# Patient Record
Sex: MALE | Race: BLACK OR AFRICAN AMERICAN | Employment: UNEMPLOYED | URBAN - METROPOLITAN AREA
[De-identification: names, ages, dates, MRNs, and addresses within clinical notes are randomized per-mention and may not be internally consistent; named-entity substitution may affect disease eponyms.]

---

## 2019-08-20 ENCOUNTER — OFFICE VISIT (OUTPATIENT)
Dept: NEUROLOGY | Age: 59
End: 2019-08-20

## 2019-08-20 ENCOUNTER — TELEPHONE (OUTPATIENT)
Dept: NEUROLOGY | Age: 59
End: 2019-08-20

## 2019-08-20 VITALS
HEIGHT: 66 IN | DIASTOLIC BLOOD PRESSURE: 60 MMHG | BODY MASS INDEX: 31.02 KG/M2 | WEIGHT: 193 LBS | OXYGEN SATURATION: 97 % | HEART RATE: 63 BPM | SYSTOLIC BLOOD PRESSURE: 130 MMHG | TEMPERATURE: 98.4 F | RESPIRATION RATE: 16 BRPM

## 2019-08-20 DIAGNOSIS — G43.019 INTRACTABLE MIGRAINE WITHOUT AURA AND WITHOUT STATUS MIGRAINOSUS: ICD-10-CM

## 2019-08-20 DIAGNOSIS — S06.9X9A TRAUMATIC BRAIN INJURY WITH LOSS OF CONSCIOUSNESS, INITIAL ENCOUNTER (HCC): Primary | ICD-10-CM

## 2019-08-20 DIAGNOSIS — R41.89 COGNITIVE IMPAIRMENT: ICD-10-CM

## 2019-08-20 DIAGNOSIS — G40.909 SEIZURE DISORDER (HCC): ICD-10-CM

## 2019-08-20 RX ORDER — DIVALPROEX SODIUM 500 MG/1
500 TABLET, EXTENDED RELEASE ORAL DAILY
Qty: 30 TAB | Refills: 3 | Status: SHIPPED | OUTPATIENT
Start: 2019-08-20

## 2019-08-20 RX ORDER — CARVEDILOL 25 MG/1
TABLET ORAL
Refills: 0 | COMMUNITY
Start: 2019-07-25

## 2019-08-20 RX ORDER — CLONIDINE HYDROCHLORIDE 0.2 MG/1
TABLET ORAL
COMMUNITY

## 2019-08-20 RX ORDER — GABAPENTIN 800 MG/1
TABLET ORAL
COMMUNITY

## 2019-08-20 RX ORDER — AMLODIPINE BESYLATE AND BENAZEPRIL HYDROCHLORIDE 10; 40 MG/1; MG/1
CAPSULE ORAL
COMMUNITY

## 2019-08-20 NOTE — PROGRESS NOTES
Roselyn Wayne presents today for   Chief Complaint   Patient presents with   [address]    Seizure       Is someone accompanying this pt? Yes, wife    Is the patient using any DME equipment during [address]? Yes, rollator    Depression Screening:  3 most recent PHQ Screens 8/20/2019   Little interest or pleasure in doing things Not at all   Feeling down, depressed, irritable, or hopeless Not at all   Total Score PHQ 2 0       Learning Assessment:  No flowsheet data found. Abuse Screening:  No flowsheet data found. Coordination of Care:  1. Have you been to the ER, urgent care clinic since your last visit? Hospitalized since your last visit? No    2. Have you seen or consulted any other health care providers outside of the [address] [place] [address] since your last visit? Include any pap smears or colon screening.  No

## 2019-08-20 NOTE — PATIENT INSTRUCTIONS
1-STOP LEVETIRACETAM  2-START DEPAKOTE ER 500MG, ONE TABLET IN THE MORNING  3-KEEP A HEADACHE JOURNAL

## 2019-08-20 NOTE — PROGRESS NOTES
HPI:  62 y/o male referred by Dr. Rosalba Tarango for evaluation of a seizure disorder. He had seen Dr. Olivia Malik back in April 2012 on one occasion. At that time he came with a history of a head injury that occurred in January 2012. There were doubts expressed on his note about what exactly happened, some notes say that he had a stroke and other history suggesting that he may have fallen out of a truck and struck his head, at any rate this was not exactly sorted out. He was admitted to the hospital, eventually went to a nursing home to recover, he had no recollection of what had happened. At the time Dr. Olivia Malik expressed that allegedly there was a history of an intra-cranial hemorrhage, but corroborating information was not available at the time of that visit. At the time problems with memory, language as well as pain throughout his entire body were reported. There is also comments that \"he may have had some seizures, having been placed on Keppra\". Because of lack of records, Dr. Rosalinda Marte at the time was to obtain his old records and see him back, but he never returned. The records available from 2012 were received and reviewed by me, with notes mentioning the fact that he fell out of a truck at work, that he had an intracranial hemorrhage, and that he was admitted to [facility] and eventually to [facility] for rehab. They add that he had some problems with writing and that he went through some physical therapy for this. I have been able to review the records from his admission to [facility]. They corroborated that the patient fell off a trash truck on the day of admission, he had vomiting, that he had altered mental status and arrived with a contusion over the right side of the face. CT scan initially showed a large left parietal hematoma with mass-effect and mild midline shift.   The reports indicates that he had 2 mm rightward shift of midline and mild mass-effect in the area with partial effacement of the left lateral ventricle. Small densities described clot as being as large as 7 cm in the left posterior cerebrum. This stayed stable with the sequential neuroimaging. He was transferred, neurosurgery was consulted. He was observed without any surgical interventions. He had complications that include urinary retention, constipation, followed with appetite (at one point treated with Marinol), and transient hyperammonemia. They describe that he had moderate to severe receptive and expressive aphasia and a right-sided hemiparesis. There is documentation that he was initially on intravenous Keppra at the n.p.o. because of \"apparent seizures\", although I do not see a specific description of the characteristics of the aforementioned seizures. He was described as having problems with word finding. 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 It seems the radiologist from the [facility] was not aware of his past history of trauma. From my review it seems that he went to establish as a new patient at the [facility] with Dr. Minnie Scott and was referred here for evaluation of his neurological situation. He comes to his visit with his mother. The history was very difficult to obtain as the patient has reported problems with language and memory, appears to be irritable, and goes frequently on a tangent and has to be frequently directed to obtain relevant history. He corroborates the above history. He adds that since he had these presumed his seizures during his stay at [facility] he has not had any since 2012. He reportedly covered enough that he was able to drive again.   He had to go through [facility] retesting, including from his report a road test and passed it, so he has been able to drive, requires DMV forms to be filled on a yearly basis. His previous neurologist was Dr. Cristy Ortega and his last forms were filled a year ago. His main complaint are his headaches. He describes that he has frequent headaches, he cannot tell me whether they are worse on one side or the other, he seems to suggest they are worse posteriorly, but is not also not clear about this. His mother suggests and he sort of corroborated that noise makes it worse. When I asked him about nausea he told me that he is  3 of once a couple of years ago but this has nothing to do with his headaches. He has no specific light sensitivity. He has no prior history of headaches prior to his injury. He has a very difficult time reporting the frequency of his headaches, but the 2 things that he is able to tell me is that they are not happening every day and that they are present at least 10 days out of the month. He is taking aspirin 81 mg for his headaches, he suggested this may be helping a little bit, but he is not very convincing about this. He denies that there was a doctor who told him that he should be taking 1 aspirin a day. He has never had an MI or a stroke. He insists early on and repeats the question as to whether he should be taking aspirin. He also has diffuse body pain and he is on gabapentin at 800 mg 3 times a day reporting that this does help to control his pain. He is on fluoxetine, although he denies being depressed. His mom does related that his personality has changed since his accident. He has problems with his thought process and remembering things and he did have permanent problems with his language that did not fully recover.           Social History     Socioeconomic History    Marital status: UNKNOWN     Spouse name: Not on file    Number of children: Not on file    Years of education: Not on file    Highest education level: Not on file   Occupational History    Not on file   Social Needs    Financial resource strain: Not on file    Food insecurity:     Worry: Not on file     Inability: Not on file    Transportation needs:     Medical: Not on file     Non-medical: Not on file   Tobacco Use    Smoking status: Never Smoker    Smokeless tobacco: Never Used   Substance and Sexual Activity    Alcohol use: No    Drug use: Not on file    Sexual activity: Not on file   Lifestyle    Physical activity:     Days per week: Not on file     Minutes per session: Not on file    Stress: Not on file   Relationships    Social connections:     Talks on phone: Not on file     Gets together: Not on file     Attends Yarsani service: Not on file     Active member of club or organization: Not on file     Attends meetings of clubs or organizations: Not on file     Relationship status: Not on file    Intimate partner violence:     Fear of current or ex partner: Not on file     Emotionally abused: Not on file     Physically abused: Not on file     Forced sexual activity: Not on file   Other Topics Concern    Not on file   Social History Narrative    Not on file       Family History   Problem Relation Age of Onset    Cancer Mother     Headache Mother     Heart Attack Father     Stroke Maternal Grandmother     Heart Disease Maternal Grandmother        Current Outpatient Medications   Medication Sig Dispense Refill    amLODIPine-benazepril (LOTREL) 10-40 mg per capsule amlodipine 10 mg-benazepril 40 mg capsule      carvedilol (COREG) 25 mg tablet take 1 tablet by mouth twice a day  0    cloNIDine HCl (CATAPRES) 0.2 mg tablet clonidine HCl 0.2 mg tablet      gabapentin (NEURONTIN) 800 mg tablet gabapentin 800 mg tablet      levETIRAcetam (KEPPRA) 250 mg tablet Take  by mouth two (2) times a day.  lisinopril (PRINIVIL, ZESTRIL) 10 mg tablet Take  by mouth daily.         acetaminophen (TYLENOL) 500 mg tablet Take  by mouth every six (6) hours as needed.  tamsulosin (FLOMAX) 0.4 mg capsule Take 0.4 mg by mouth daily.  dronabinol (MARINOL) 2.5 mg capsule Take 2.5 mg by mouth two (2) times a day.  FLUoxetine (PROZAC) 20 mg capsule Take  by mouth daily. Past Medical History:   Diagnosis Date    Chest pain     feet tingling    Diarrhea     recurrent    Dizzy spells     Essential hypertension     Memory difficulty     Severe headache     Unspecified cerebral artery occlusion with cerebral infarction     patient states they have right side weakness       History reviewed. No pertinent surgical history. No Known Allergies    Patient Active Problem List   Diagnosis Code    Intracranial bleed ([facility] I62.9         Review of Systems:   Constitutional: no fever or chills  Skin denies rash or itching  HEENT:  Denies tinnitus, hearing loss, or visual changes  Respiratory: denies shortness of breath  Cardiovascular: denies chest pain, dyspnea on exertion  Gastrointestinal: does not report nausea or vomiting  Genitourinary: does not report dysuria or incontinence  Musculoskeletal: does not report joint pain or swelling  Endocrine: denies weight change  Hematology: denies easy bruising or bleeding   Neurological: as above in HPI      PHYSICAL EXAMINATION:         Vital signs:    Visit Vitals  /60 (BP 1 Location: Left arm, BP Patient Position: Sitting)   Pulse 63   Temp 98.4 °F (36.9 °C) (Oral)   Resp 16   Ht 5' 6\" (1.676 m)   Wt 87.5 kg (193 lb)   SpO2 97%   BMI 31.15 kg/m²         GENERAL:                  Well developed, well nourished, in no apparent distress. HEART:                       RR, no murmurs  EXTREMITIES:           No edema is identified. Pulses are +2. HEAD:                         Normocephalic, atraumatic. NEUROLOGIC EXAMINATION       MENTAL STATUS:     Awake, alert, and oriented x 4. Attention, fund of knowledge, and STM are diminished.  There are problems with word finding of the medical structure appears to be good. His rate of speech is slow but good articulation. He is quite irritable and frequently goes on a tangent   CRANIAL NERVES:   Visual fields are full to confrontation. Pupils are reactive to light and accommodation. Fundi are normal.   Extraocular movements are intact and there is no nystagmus. Facial sensation is normal  Mild right lower facial droop  Hearing is present. SCM/TPZ 5/5  Tongue protrudes midline, palate elevates symmetrically. MOTOR:          5/5 strength without drift     CEREBELLAR:           No tremors or dysmetria     SENSORY:     Normal distal pinprick, proprioception, and vibration. Romberg could not be tested                 DTR's:                          +3 in the right                 GAIT:                            Slow and apractic gait with slow steps, relatively symmetric    I have personally reviewed imaging studies. Impression: Traumatic brain injury specifically with a left parietal intracranial hemorrhage resulting in some right-sided hemiparesis which seems for the most prior to have recovered at least 95% with residual right facial droop and a mild nonfluent aphasia. He also has a significant amount of problems with a irritability/personality changes, concentration, and short-term memory problems as a result. The history of a seizure is very obscure. He cannot corroborate it and [facility] charts there is documentation of this is \"apparent seizures\" as the reason why Keppra was started, medication that he continues to this day in which could be adding to his irritability. 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   His cognitive impairment and some slowness particularly when walking and would like movement would raise concern regarding his ability to drive, which cannot be determined at the bedside, but he seemingly has gone through the process of being reevaluated by the [facility] and has past and has been able to be monitored regularly and keep his 's license. Plan: 1-Will try stopping levetiracetam and starting him on Depakote extended release 500 mg daily to see if this could help his posttraumatic migraines and his mood. Of note, he has not apparently had any seizures on a very low dose of Keppra for many years and he is also on gabapentin for other reasons, but at doses that would also be doses effective for control of seizures. This decreases the risk of breakthrough seizures with this transition, something I advised him about. 2-advised him to keep a headache calendar to get a better idea of the frequency of his headaches. 3-advised him that at this point his language and memory problems are permanent. 4- counseled him about driving. I will need to see the results of his rib test to make sure that the [facility] cleared him. He will provide this before feeling his DMV forms again, which I do in November. 5-Will procure Dr. Haydee Vega. 6-follow-up in 2 months. 40 out of 60 minutes were spent today in counseling on the multiple above seizures, including headache control, antiepileptic use, medications under side effects, the effects of his traumatic brain injury, and driving issues. PLEASE NOTE:   Portions of this document may have been produced using voice recognition software. Unrecognized errors in transcription may be present. This note will not be viewable in [address]

## 2019-08-23 NOTE — TELEPHONE ENCOUNTER
Pt stopped by the [facility] office to  DMV paperwork. Pt was informed he will receive a call from our office once forms are completed for . Please notify pt.

## 2019-09-05 NOTE — TELEPHONE ENCOUNTER
Dropped by [facility] office to  DMV paperwork. Informed Dr. Ninfa Zurita would be back on Monday and per Darby will be able to  said DMV paperwork on Tuesday or Wednesday.

## 2019-09-13 ENCOUNTER — TELEPHONE (OUTPATIENT)
Dept: NEUROLOGY | Age: 59
End: 2019-09-13

## 2019-09-13 NOTE — TELEPHONE ENCOUNTER
Patient came into the office requesting DMV paperwork. I informed the patient that Dr. Weiss did not want to fill out the paperwork until the patients demographics were done. The patient became irate and stated that hes been waiting three weeks for this paperwork. I tried to inform him that I tried to contact him multiple times on this matter to get this step done. The patient stated that he did not receive any calls or messages. When asking the patient to fill out the demographics he walked out of the office saying rude things to me. Such as I'm only doing my part in holding the door for him and that's all I am good for. I offered to have the patient stay in office and fill out the form that way I can put in in Dr. Weiss folder for him to fill out. The patient then left the office and then came back in to fill out the form.

## 2019-09-13 NOTE — TELEPHONE ENCOUNTER
Dr. Josette Thornton informed me that he needs the patients passing driving test paperwork. I tried to call the patient, he did not answer. !st attempt. DISPLAY PLAN FREE TEXT

## 2019-09-16 ENCOUNTER — TELEPHONE (OUTPATIENT)
Dept: NEUROLOGY | Age: 59
End: 2019-09-16

## 2019-09-16 ENCOUNTER — DOCUMENTATION ONLY (OUTPATIENT)
Dept: NEUROLOGY | Age: 59
End: 2019-09-16

## 2019-09-16 NOTE — PROGRESS NOTES
Patient came back into office with a sticky not and a stamp on it from what looks like a [employer] employee. I informed him that we cannot accept the note and that we need an actual form from the [facility]. Patient's mom was with him and was making the interaction hard due to her being irate. I gave the patient the fax number to our office and told him to have the [facility] fax his road test form to our office. The patient kept mumbling \"I know what this is all about\". I tried to reassure to the patient that we were looking out for his best interest and he stated that he did not want to hear that. The patient stated that we (as the office) didn't want to help him with his issue.

## 2019-09-16 NOTE — PROGRESS NOTES
Patient came into clinic in regards to his DMV forms. Patient needed to provide a copy of a passing road test per Dr. Wellington Aldridge: counseled him about driving. I will need to see the results of his rib test to make sure that the [facility] cleared him. He will provide this before feeling his DMV forms again, which I do in November. I confirmed with Dr. Wellington Aldridge that the patient needs a passing road test in order for MD to sign off on his form. Patient was irritated when I told him he needs that form but it doesn't seem like the patient understood Dr. Wellington Aldridge instructions from his last visit.

## 2019-09-16 NOTE — TELEPHONE ENCOUNTER
Requested Prescriptions     Pending Prescriptions Disp Refills    divalproex ER (DEPAKOTE ER) 500 mg ER tablet 30 Tab 3     Sig: Take 1 Tab by mouth daily.

## 2019-09-16 NOTE — TELEPHONE ENCOUNTER
Spoke with the patients mother and informed her that I need the road test results for her son. Mother said she would bring it by for Dr. uSmmer Rodríguez.

## 2019-09-16 NOTE — PROGRESS NOTES
Our discussion was they were to produce results of the actual road test with the [facility], which they told me he had. IF it can't be produced will need another road test and will gladly fill forms to that effect.

## 2019-09-18 RX ORDER — DIVALPROEX SODIUM 500 MG/1
500 TABLET, EXTENDED RELEASE ORAL DAILY
Qty: 30 TAB | Refills: 3 | OUTPATIENT
Start: 2019-09-18

## 2019-09-18 NOTE — TELEPHONE ENCOUNTER
Patient has refills already at pharmacy, there is no need for MD to approve more refills at this time. Received verbal order per Dr. Axel Kwong.

## 2019-09-25 ENCOUNTER — TELEPHONE (OUTPATIENT)
Dept: NEUROLOGY | Age: 59
End: 2019-09-25

## 2019-09-25 NOTE — TELEPHONE ENCOUNTER
Patient came into the office today requesting an appointment with Dr. Corona Santizo. Patient states his next appointment was with Dr. Satya Rascon but he did not want to see him again because Dr. Jason Blunt him the run around. \" Patient states that he has seen Dr. Corona Santizo before but it has been some years. Reviewed chart and last seen by Dr. Corona Santizo in 2012. Explained to patient that I could not schedule an appointment today because we have to have the doctor's permission to switch him. Explained that I would send a message to Dr. Corona Santizo with his request. Patient should expect phone call back late next week, as provider is out of the office. Patient verbalized understanding.  States his best phone number is his mom's phone at: [phone]

## 2022-09-21 ENCOUNTER — HOSPITAL ENCOUNTER (EMERGENCY)
Age: 62
Discharge: HOME | End: 2022-09-22
Attending: EMERGENCY MEDICINE
Payer: MEDICARE

## 2022-09-21 ENCOUNTER — APPOINTMENT (OUTPATIENT)
Dept: CT IMAGING | Age: 62
End: 2022-09-21
Attending: PHYSICIAN ASSISTANT
Payer: MEDICARE

## 2022-09-21 ENCOUNTER — APPOINTMENT (OUTPATIENT)
Dept: GENERAL RADIOLOGY | Age: 62
End: 2022-09-21
Attending: PHYSICIAN ASSISTANT
Payer: MEDICARE

## 2022-09-21 VITALS
DIASTOLIC BLOOD PRESSURE: 97 MMHG | BODY MASS INDEX: 28.58 KG/M2 | OXYGEN SATURATION: 96 % | RESPIRATION RATE: 22 BRPM | HEIGHT: 69 IN | SYSTOLIC BLOOD PRESSURE: 163 MMHG | HEART RATE: 70 BPM | WEIGHT: 193 LBS

## 2022-09-21 DIAGNOSIS — R56.9 SEIZURE (HCC): Primary | ICD-10-CM

## 2022-09-21 LAB
ANION GAP SERPL CALC-SCNC: 18 MMOL/L (ref 3–18)
ATRIAL RATE: 80 BPM
BASOPHILS # BLD: 0 K/UL (ref 0–0.1)
BASOPHILS NFR BLD: 1 % (ref 0–2)
BUN SERPL-MCNC: 9 MG/DL (ref 7–18)
BUN/CREAT SERPL: 7 (ref 12–20)
CALCIUM SERPL-MCNC: 10.3 MG/DL (ref 8.5–10.1)
CALCULATED P AXIS, ECG09: 48 DEGREES
CALCULATED R AXIS, ECG10: 15 DEGREES
CALCULATED T AXIS, ECG11: 47 DEGREES
CHLORIDE SERPL-SCNC: 108 MMOL/L (ref 100–111)
CO2 SERPL-SCNC: 19 MMOL/L (ref 21–32)
CREAT SERPL-MCNC: 1.25 MG/DL (ref 0.6–1.3)
DIAGNOSIS, 93000: NORMAL
DIFFERENTIAL METHOD BLD: ABNORMAL
EOSINOPHIL # BLD: 0.1 K/UL (ref 0–0.4)
EOSINOPHIL NFR BLD: 2 % (ref 0–5)
ERYTHROCYTE [DISTWIDTH] IN BLOOD BY AUTOMATED COUNT: 12.7 % (ref 11.6–14.5)
ETHANOL SERPL-MCNC: <3 MG/DL (ref 0–3)
GLUCOSE BLD STRIP.AUTO-MCNC: 142 MG/DL (ref 70–110)
GLUCOSE SERPL-MCNC: 165 MG/DL (ref 74–99)
HCT VFR BLD AUTO: 50.4 % (ref 36–48)
HGB BLD-MCNC: 15.5 G/DL (ref 13–16)
IMM GRANULOCYTES # BLD AUTO: 0 K/UL (ref 0–0.04)
IMM GRANULOCYTES NFR BLD AUTO: 0 % (ref 0–0.5)
LYMPHOCYTES # BLD: 2.9 K/UL (ref 0.9–3.6)
LYMPHOCYTES NFR BLD: 37 % (ref 21–52)
MAGNESIUM SERPL-MCNC: 2.4 MG/DL (ref 1.6–2.6)
MCH RBC QN AUTO: 29.1 PG (ref 24–34)
MCHC RBC AUTO-ENTMCNC: 30.8 G/DL (ref 31–37)
MCV RBC AUTO: 94.7 FL (ref 78–100)
MONOCYTES # BLD: 0.7 K/UL (ref 0.05–1.2)
MONOCYTES NFR BLD: 10 % (ref 3–10)
NEUTS SEG # BLD: 3.9 K/UL (ref 1.8–8)
NEUTS SEG NFR BLD: 51 % (ref 40–73)
NRBC # BLD: 0 K/UL (ref 0–0.01)
NRBC BLD-RTO: 0 PER 100 WBC
P-R INTERVAL, ECG05: 176 MS
PLATELET # BLD AUTO: 264 K/UL (ref 135–420)
PMV BLD AUTO: 8.4 FL (ref 9.2–11.8)
POTASSIUM SERPL-SCNC: 3.5 MMOL/L (ref 3.5–5.5)
Q-T INTERVAL, ECG07: 414 MS
QRS DURATION, ECG06: 104 MS
QTC CALCULATION (BEZET), ECG08: 477 MS
RBC # BLD AUTO: 5.32 M/UL (ref 4.35–5.65)
SODIUM SERPL-SCNC: 145 MMOL/L (ref 136–145)
VENTRICULAR RATE, ECG03: 80 BPM
WBC # BLD AUTO: 7.7 K/UL (ref 4.6–13.2)

## 2022-09-21 PROCEDURE — 82077 ASSAY SPEC XCP UR&BREATH IA: CPT

## 2022-09-21 PROCEDURE — 83735 ASSAY OF MAGNESIUM: CPT

## 2022-09-21 PROCEDURE — 96375 TX/PRO/DX INJ NEW DRUG ADDON: CPT

## 2022-09-21 PROCEDURE — 96365 THER/PROPH/DIAG IV INF INIT: CPT

## 2022-09-21 PROCEDURE — 93005 ELECTROCARDIOGRAM TRACING: CPT

## 2022-09-21 PROCEDURE — 71045 X-RAY EXAM CHEST 1 VIEW: CPT

## 2022-09-21 PROCEDURE — 85025 COMPLETE CBC W/AUTO DIFF WBC: CPT

## 2022-09-21 PROCEDURE — 82962 GLUCOSE BLOOD TEST: CPT

## 2022-09-21 PROCEDURE — 74011250636 HC RX REV CODE- 250/636: Performed by: PHYSICIAN ASSISTANT

## 2022-09-21 PROCEDURE — 80177 DRUG SCRN QUAN LEVETIRACETAM: CPT

## 2022-09-21 PROCEDURE — 80048 BASIC METABOLIC PNL TOTAL CA: CPT

## 2022-09-21 PROCEDURE — 99285 EMERGENCY DEPT VISIT HI MDM: CPT

## 2022-09-21 PROCEDURE — 70450 CT HEAD/BRAIN W/O DYE: CPT

## 2022-09-21 PROCEDURE — 96374 THER/PROPH/DIAG INJ IV PUSH: CPT

## 2022-09-21 RX ORDER — ONDANSETRON 2 MG/ML
4 INJECTION INTRAMUSCULAR; INTRAVENOUS
Status: COMPLETED | OUTPATIENT
Start: 2022-09-21 | End: 2022-09-21

## 2022-09-21 RX ORDER — LORAZEPAM 2 MG/ML
1 INJECTION, SOLUTION INTRAMUSCULAR; INTRAVENOUS
Status: DISCONTINUED | OUTPATIENT
Start: 2022-09-21 | End: 2022-09-21 | Stop reason: HOSPADM

## 2022-09-21 RX ORDER — LEVETIRACETAM 15 MG/ML
1500 INJECTION INTRAVASCULAR EVERY 12 HOURS
Status: DISCONTINUED | OUTPATIENT
Start: 2022-09-21 | End: 2022-09-21 | Stop reason: HOSPADM

## 2022-09-21 RX ADMIN — LORAZEPAM 1 MG: 2 INJECTION, SOLUTION INTRAMUSCULAR; INTRAVENOUS at 12:49

## 2022-09-21 RX ADMIN — LEVETIRACETAM 1500 MG: 1500 INJECTION, SOLUTION INTRAVENOUS at 12:50

## 2022-09-21 RX ADMIN — ONDANSETRON 4 MG: 2 INJECTION INTRAMUSCULAR; INTRAVENOUS at 13:23

## 2022-09-21 NOTE — ED NOTES
Patient returned from [address]. Patient is more alert and answering questions appropriately. Mother at bedside. Poly GUTIERREZ assessing patient.

## 2022-09-21 NOTE — ED TRIAGE NOTES
Client had witness seizure at home this am by clients mother. Client in front of er post ictal in car, unresponsive, with accelerated respirations. Reports hx of seizure per family.

## 2022-09-21 NOTE — DISCHARGE INSTRUCTIONS
Take medication as prescribed. Follow-up with your primary care physician and neurologist within 2 days for reassessment. Bring the results from this visit with you for their review. Return to the ED immediately for any new, worsening, or persistent symptoms, including dizziness, changes in behavior, or any other medical concerns.

## 2022-09-21 NOTE — ED PROVIDER NOTES
EMERGENCY DEPARTMENT HISTORY AND PHYSICAL EXAM    12:58 PM      Date: 9/21/2022  Patient Name: Tangela Steiner    History of Presenting Illness     Chief Complaint   Patient presents with    Seizure         History Provided By: Patient and Patient's Mother (Hx limited from patient as he is post-ictal upon my initial assessment)    Additional History (Context): Tangela Steiner is a 58 y.o. male with  hx of TBI, seizure, CVA, HTN, and other noted PMH  who presents with mother due to witnessed seizure that occurred just PTA. Mother notes witnessed generalized tonic-clonic seizure in the car that lasted a few minutes. Patient was postictal upon arrival to the ER. Mother notes he has been taking medication as prescribed. Denies fever/chills, cough, abdominal pain, n/v/d. Denies head injury or trauma. Denies any other concerns or complaints. PCP: Marelyn Osler, MD    Current Facility-Administered Medications   Medication Dose Route Frequency Provider Last Rate Last Admin    levETIRAcetam (KEPPRA) 1500 mg in saline (iso-osm) 100 ml IVPB  1,500 mg IntraVENous Q12H BRENDA Fofana 400 mL/hr at 09/21/22 1250 1,500 mg at 09/21/22 1250    LORazepam (ATIVAN) 2 mg/mL injection 1 mg  1 mg IntraVENous NOW BRENDA Fofana         Current Outpatient Medications   Medication Sig Dispense Refill    amLODIPine-benazepril (LOTREL) 10-40 mg per capsule amlodipine 10 mg-benazepril 40 mg capsule      carvedilol (COREG) 25 mg tablet take 1 tablet by mouth twice a day  0    cloNIDine HCl (CATAPRES) 0.2 mg tablet clonidine HCl 0.2 mg tablet      gabapentin (NEURONTIN) 800 mg tablet gabapentin 800 mg tablet      divalproex ER (DEPAKOTE ER) 500 mg ER tablet Take 1 Tab by mouth daily. 30 Tab 3    lisinopril (PRINIVIL, ZESTRIL) 10 mg tablet Take  by mouth daily. acetaminophen (TYLENOL) 500 mg tablet Take  by mouth every six (6) hours as needed. tamsulosin (FLOMAX) 0.4 mg capsule Take 0.4 mg by mouth daily. dronabinol (MARINOL) 2.5 mg capsule Take 2.5 mg by mouth two (2) times a day. FLUoxetine (PROZAC) 20 mg capsule Take  by mouth daily. Past History     Past Medical History:  Past Medical History:   Diagnosis Date    Chest pain     feet tingling    Diarrhea     recurrent    Dizzy spells     Essential hypertension     Memory difficulty     Severe headache     Unspecified cerebral artery occlusion with cerebral infarction     patient states they have right side weakness       Past Surgical History:  No past surgical history on file. Family History:  Family History   Problem Relation Age of Onset    Cancer Mother     Headache Mother     Heart Attack Father     Stroke Maternal Grandmother     Heart Disease Maternal Grandmother        Social History:  Social History     Tobacco Use    Smoking status: Never    Smokeless tobacco: Never   Substance Use Topics    Alcohol use: No       Allergies:  No Known Allergies      Review of Systems       Review of Systems   Constitutional:  Negative for chills and fever. Respiratory:  Negative for shortness of breath. Cardiovascular:  Negative for chest pain. Gastrointestinal:  Negative for abdominal pain, nausea and vomiting. Skin:  Negative for rash. Neurological:  Positive for seizures. Negative for weakness. All other systems reviewed and are negative. Physical Exam   Visit Vitals  BP (!) 163/97 (BP 1 Location: Left upper arm, BP Patient Position: Semi fowlers)   Pulse 70   Resp 22   Ht 5' 9\" (1.753 m)   Wt 87.5 kg (193 lb)   SpO2 96%   BMI 28.50 kg/m²         Physical Exam  Vitals and nursing note reviewed. Constitutional:       General: He is not in acute distress. Appearance: He is well-developed. He is obese. He is diaphoretic. HENT:      Head: Normocephalic and atraumatic. Cardiovascular:      Rate and Rhythm: Normal rate and regular rhythm. Heart sounds: Normal heart sounds. No murmur heard. No friction rub. No gallop. Pulmonary:      Effort: Pulmonary effort is normal. No respiratory distress. Breath sounds: Normal breath sounds. No wheezing or rales. Abdominal:      General: Abdomen is flat. There is no distension. Palpations: Abdomen is soft. Tenderness: There is no abdominal tenderness. There is no guarding or rebound. Musculoskeletal:         General: Normal range of motion. Cervical back: Normal range of motion and neck supple. Skin:     General: Skin is warm. Findings: No rash. Neurological:      Mental Status: He is lethargic. Diagnostic Study Results     Labs -  Recent Results (from the past 12 hour(s))   GLUCOSE, POC    Collection Time: 09/21/22 12:33 PM   Result Value Ref Range    Glucose (POC) 142 (H) 70 - 110 mg/dL   CBC WITH AUTOMATED DIFF    Collection Time: 09/21/22 12:35 PM   Result Value Ref Range    WBC 7.7 4.6 - 13.2 K/uL    RBC 5.32 4.35 - 5.65 M/uL    HGB 15.5 13.0 - 16.0 g/dL    HCT 50.4 (H) 36.0 - 48.0 %    MCV 94.7 78.0 - 100.0 FL    MCH 29.1 24.0 - 34.0 PG    MCHC 30.8 (L) 31.0 - 37.0 g/dL    RDW 12.7 11.6 - 14.5 %    PLATELET 756 713 - 631 K/uL    MPV 8.4 (L) 9.2 - 11.8 FL    NRBC 0.0 0  WBC    ABSOLUTE NRBC 0.00 0.00 - 0.01 K/uL    NEUTROPHILS 51 40 - 73 %    LYMPHOCYTES 37 21 - 52 %    MONOCYTES 10 3 - 10 %    EOSINOPHILS 2 0 - 5 %    BASOPHILS 1 0 - 2 %    IMMATURE GRANULOCYTES 0 0.0 - 0.5 %    ABS. NEUTROPHILS 3.9 1.8 - 8.0 K/UL    ABS. LYMPHOCYTES 2.9 0.9 - 3.6 K/UL    ABS. MONOCYTES 0.7 0.05 - 1.2 K/UL    ABS. EOSINOPHILS 0.1 0.0 - 0.4 K/UL    ABS. BASOPHILS 0.0 0.0 - 0.1 K/UL    ABS. IMM.  GRANS. 0.0 0.00 - 0.04 K/UL    DF AUTOMATED     METABOLIC PANEL, BASIC    Collection Time: 09/21/22 12:35 PM   Result Value Ref Range    Sodium 145 136 - 145 mmol/L    Potassium 3.5 3.5 - 5.5 mmol/L    Chloride 108 100 - 111 mmol/L    CO2 19 (L) 21 - 32 mmol/L    Anion gap 18 3.0 - 18 mmol/L    Glucose 165 (H) 74 - 99 mg/dL    BUN 9 7.0 - 18 MG/DL    Creatinine 1. 25 0.6 - 1.3 MG/DL    BUN/Creatinine ratio 7 (L) 12 - 20      GFR est AA >60 >60 ml/min/1.73m2    GFR est non-AA 59 (L) >60 ml/min/1.73m2    Calcium 10.3 (H) 8.5 - 10.1 MG/DL   ETHYL ALCOHOL    Collection Time: 09/21/22 12:35 PM   Result Value Ref Range    ALCOHOL(ETHYL),SERUM <3 0 - 3 MG/DL   MAGNESIUM    Collection Time: 09/21/22 12:35 PM   Result Value Ref Range    Magnesium 2.4 1.6 - 2.6 mg/dL   EKG, 12 LEAD, INITIAL    Collection Time: 09/21/22 12:43 PM   Result Value Ref Range    Ventricular Rate 80 BPM    Atrial Rate 80 BPM    P-R Interval 176 ms    QRS Duration 104 ms    Q-T Interval 414 ms    QTC Calculation (Bezet) 477 ms    Calculated P Axis 48 degrees    Calculated R Axis 15 degrees    Calculated T Axis 47 degrees    Diagnosis       Normal sinus rhythm  Possible Left atrial enlargement  Borderline ECG  When compared with ECG of 01-FEB-2012 11:56,  No significant change was found  Confirmed by Rut Rees MD, ----- ([id number]) on 9/21/2022 3:53:02 PM         Radiologic Studies -   CT HEAD WO CONT   Final Result      No acute intracranial abnormality. Note: If clinical concern of acute stroke, CTA or MRI with diffusion weighted   images is recommended for better evaluation. Remote left parietal infarction. Thank you for your referral.      XR CHEST PORT   Final Result      Mildly enlarged cardiac silhouette with perihilar hazy streaky infiltrate/edema. Medical Decision Making   I am the first provider for this patient. I reviewed the vital signs, available nursing notes, past medical history, past surgical history, family history and social history. Vital Signs-Reviewed the patient's vital signs. Pulse Oximetry Analysis -   96% on room air    Records Reviewed: Nursing Notes, Old Medical Records, and Previous electrocardiograms (Time of Review: 12:58 PM)    ED Course: Progress Notes, Reevaluation, and Consults:  2:00 PM: Pt alert, talkative, no distress.  Denies any concerns or complaints. Alert and oriented, no neurologic deficit on examination. Asking for something to drink. 4:16 PM: Mother and patient are asking to leave. Mother notes patient is baseline mental status. Reviewed results and plan with patient and mother. Discussed need for close outpatient follow-up this week for reassessment. Discussed strict return precautions, including fever, dizziness, change in vision, or any other medical concerns. Provider Notes (Medical Decision Making): 49-year-old male with history of CVA, seizures who presents to the ED after witnessed generalized tonic-clonic seizure. Patient was postictal upon my initial assessment, observed in the ED x4 hours without recurrence of seizures. Pt became alert, oriented, no neurologic deficit. Labs essentially unremarkable except for mild hyperglycemia, CT head negative for acute process. Keppra loading dose administered in the ED. patient is stable for discharge with close outpatient follow-up with neurologist for further assessment. Strict return precautions provided. Diagnosis     Clinical Impression:   1. Seizure [facility])        Disposition: home     Follow-up Information       Follow up With Specialties Details Why [address] [facility] EMERGENCY DEPT Emergency Medicine  If symptoms worsen [address] [facility]  [phone]    Deny Short MD Family Medicine Schedule an appointment as soon as possible for a visit   [address] [place] [address]  [phone]               Patient's Medications   Start Taking    No medications on file   Continue Taking    ACETAMINOPHEN (TYLENOL) 500 MG TABLET    Take  by mouth every six (6) hours as needed.       AMLODIPINE-BENAZEPRIL (LOTREL) 10-40 MG PER CAPSULE    amlodipine 10 mg-benazepril 40 mg capsule    CARVEDILOL (COREG) 25 MG TABLET    take 1 tablet by mouth twice a day    CLONIDINE HCL (CATAPRES) 0.2 MG TABLET    clonidine HCl 0.2 mg tablet DIVALPROEX ER (DEPAKOTE ER) 500 MG ER TABLET    Take 1 Tab by mouth daily. DRONABINOL (MARINOL) 2.5 MG CAPSULE    Take 2.5 mg by mouth two (2) times a day. FLUOXETINE (PROZAC) 20 MG CAPSULE    Take  by mouth daily. GABAPENTIN (NEURONTIN) 800 MG TABLET    gabapentin 800 mg tablet    LISINOPRIL (PRINIVIL, ZESTRIL) 10 MG TABLET    Take  by mouth daily. TAMSULOSIN (FLOMAX) 0.4 MG CAPSULE    Take 0.4 mg by mouth daily. These Medications have changed    No medications on file   Stop Taking    No medications on file       Dictation disclaimer:  Please note that this dictation was completed with [email or web address], the computer voice recognition software. Quite often unanticipated grammatical, syntax, homophones, and other interpretive errors are inadvertently transcribed by the computer software. Please disregard these errors. Please excuse any errors that have escaped final proofreading.

## 2022-09-23 LAB — LEVETIRACETAM SERPL-MCNC: 12.8 UG/ML (ref 10–40)

## 2022-12-02 ENCOUNTER — APPOINTMENT (OUTPATIENT)
Dept: CT IMAGING | Age: 62
End: 2022-12-02
Attending: EMERGENCY MEDICINE
Payer: MEDICARE

## 2022-12-02 ENCOUNTER — HOSPITAL ENCOUNTER (EMERGENCY)
Age: 62
Discharge: HOME | End: 2022-12-02
Attending: EMERGENCY MEDICINE
Payer: MEDICARE

## 2022-12-02 ENCOUNTER — APPOINTMENT (OUTPATIENT)
Dept: GENERAL RADIOLOGY | Age: 62
End: 2022-12-02
Attending: EMERGENCY MEDICINE
Payer: MEDICARE

## 2022-12-02 VITALS
OXYGEN SATURATION: 94 % | HEART RATE: 77 BPM | DIASTOLIC BLOOD PRESSURE: 87 MMHG | RESPIRATION RATE: 16 BRPM | SYSTOLIC BLOOD PRESSURE: 158 MMHG | TEMPERATURE: 98.6 F

## 2022-12-02 DIAGNOSIS — R41.0 ACUTE CONFUSION: Primary | ICD-10-CM

## 2022-12-02 LAB
ALBUMIN SERPL-MCNC: 4.4 G/DL (ref 3.4–5)
ALBUMIN/GLOB SERPL: 0.9 {RATIO} (ref 0.8–1.7)
ALP SERPL-CCNC: 107 U/L (ref 45–117)
ALT SERPL-CCNC: 24 U/L (ref 16–61)
ANION GAP SERPL CALC-SCNC: 7 MMOL/L (ref 3–18)
AST SERPL-CCNC: 33 U/L (ref 10–38)
BASOPHILS # BLD: 0 K/UL (ref 0–0.1)
BASOPHILS NFR BLD: 0 % (ref 0–2)
BILIRUB SERPL-MCNC: 1 MG/DL (ref 0.2–1)
BUN SERPL-MCNC: 20 MG/DL (ref 7–18)
BUN/CREAT SERPL: 11 (ref 12–20)
CALCIUM SERPL-MCNC: 9.8 MG/DL (ref 8.5–10.1)
CHLORIDE SERPL-SCNC: 100 MMOL/L (ref 100–111)
CO2 SERPL-SCNC: 34 MMOL/L (ref 21–32)
CREAT SERPL-MCNC: 1.76 MG/DL (ref 0.6–1.3)
DIFFERENTIAL METHOD BLD: ABNORMAL
EOSINOPHIL # BLD: 0 K/UL (ref 0–0.4)
EOSINOPHIL NFR BLD: 0 % (ref 0–5)
ERYTHROCYTE [DISTWIDTH] IN BLOOD BY AUTOMATED COUNT: 13.2 % (ref 11.6–14.5)
GLOBULIN SER CALC-MCNC: 4.9 G/DL (ref 2–4)
GLUCOSE SERPL-MCNC: 120 MG/DL (ref 74–99)
HCT VFR BLD AUTO: 47.4 % (ref 36–48)
HGB BLD-MCNC: 15 G/DL (ref 13–16)
IMM GRANULOCYTES # BLD AUTO: 0 K/UL (ref 0–0.04)
IMM GRANULOCYTES NFR BLD AUTO: 1 % (ref 0–0.5)
LYMPHOCYTES # BLD: 1 K/UL (ref 0.9–3.6)
LYMPHOCYTES NFR BLD: 16 % (ref 21–52)
MCH RBC QN AUTO: 29.2 PG (ref 24–34)
MCHC RBC AUTO-ENTMCNC: 31.6 G/DL (ref 31–37)
MCV RBC AUTO: 92.2 FL (ref 78–100)
MONOCYTES # BLD: 0.5 K/UL (ref 0.05–1.2)
MONOCYTES NFR BLD: 8 % (ref 3–10)
NEUTS SEG # BLD: 4.4 K/UL (ref 1.8–8)
NEUTS SEG NFR BLD: 75 % (ref 40–73)
NRBC # BLD: 0 K/UL (ref 0–0.01)
NRBC BLD-RTO: 0 PER 100 WBC
PLATELET # BLD AUTO: 253 K/UL (ref 135–420)
PMV BLD AUTO: 8.5 FL (ref 9.2–11.8)
POTASSIUM SERPL-SCNC: 3.3 MMOL/L (ref 3.5–5.5)
PROT SERPL-MCNC: 9.3 G/DL (ref 6.4–8.2)
RBC # BLD AUTO: 5.14 M/UL (ref 4.35–5.65)
SODIUM SERPL-SCNC: 141 MMOL/L (ref 136–145)
WBC # BLD AUTO: 5.9 K/UL (ref 4.6–13.2)

## 2022-12-02 PROCEDURE — 74011250636 HC RX REV CODE- 250/636: Performed by: EMERGENCY MEDICINE

## 2022-12-02 PROCEDURE — 80053 COMPREHEN METABOLIC PANEL: CPT

## 2022-12-02 PROCEDURE — 70450 CT HEAD/BRAIN W/O DYE: CPT

## 2022-12-02 PROCEDURE — 99284 EMERGENCY DEPT VISIT MOD MDM: CPT

## 2022-12-02 PROCEDURE — 85025 COMPLETE CBC W/AUTO DIFF WBC: CPT

## 2022-12-02 PROCEDURE — 71045 X-RAY EXAM CHEST 1 VIEW: CPT

## 2022-12-02 RX ADMIN — SODIUM CHLORIDE 1000 ML: 9 INJECTION, SOLUTION INTRAVENOUS at 12:38

## 2022-12-02 NOTE — ED PROVIDER NOTES
EMERGENCY DEPARTMENT HISTORY AND PHYSICAL EXAM    Date: 12/2/2022  Patient Name: Timbo Dumont    History of Presenting Illness     Chief Complaint   Patient presents with    Altered mental status         History Provided By:     Chief Complaint:  Duration:   Timing:    Location:   Quality:   Severity:   Modifying Factors:   Associated Symptoms: none       Additional History (Context): Timbo Dumont is a 58 y.o. male with a history of prior traumatic brain injury, seizures and CVA who presents to the emergency department by EMS due to altered mental status. History is per his mother and EMS. His mother apparently took him to the fire station because he was \"not acting right\". She states that he typically lives on his own, does drive and typically visits her at her house every day. She states that he had not come over this morning and choosing to go see him when he showed up and was not wearing appropriate close as he only had a very light shirt on and it was quite cold outside. He stated that he had \"somewhere to go\" and was asking her for car keys, she stated that she did not given to them but drove him to the fire station because he continued to act in an unusual way. Dates that this is happened before, he was seen at this emergency department but she is unclear whether there was any diagnosis. PCP: Jonn Picktet MD    Current Outpatient Medications   Medication Sig Dispense Refill    amLODIPine-benazepril (LOTREL) 10-40 mg per capsule amlodipine 10 mg-benazepril 40 mg capsule      carvedilol (COREG) 25 mg tablet take 1 tablet by mouth twice a day  0    cloNIDine HCl (CATAPRES) 0.2 mg tablet clonidine HCl 0.2 mg tablet      gabapentin (NEURONTIN) 800 mg tablet gabapentin 800 mg tablet      divalproex ER (DEPAKOTE ER) 500 mg ER tablet Take 1 Tab by mouth daily. 30 Tab 3    lisinopril (PRINIVIL, ZESTRIL) 10 mg tablet Take  by mouth daily.         acetaminophen (TYLENOL) 500 mg tablet Take  by mouth every six (6) hours as needed. tamsulosin (FLOMAX) 0.4 mg capsule Take 0.4 mg by mouth daily. dronabinol (MARINOL) 2.5 mg capsule Take 2.5 mg by mouth two (2) times a day. FLUoxetine (PROZAC) 20 mg capsule Take  by mouth daily. Past History     Past Medical History:  Past Medical History:   Diagnosis Date    Chest pain     feet tingling    Diarrhea     recurrent    Dizzy spells     Essential hypertension     Memory difficulty     Severe headache     Unspecified cerebral artery occlusion with cerebral infarction     patient states they have right side weakness       Past Surgical History:  No past surgical history on file. Family History:  Family History   Problem Relation Age of Onset    Cancer Mother     Headache Mother     Heart Attack Father     Stroke Maternal Grandmother     Heart Disease Maternal Grandmother        Social History:  Social History     Tobacco Use    Smoking status: Never    Smokeless tobacco: Never   Substance Use Topics    Alcohol use: No       Allergies:  No Known Allergies      Review of Systems   Review of Systems   Unable to perform ROS: Mental status change   All Other Systems Negative  Physical Exam     Vitals:    12/02/22 1240   BP: (!) 158/87   Pulse: 77   Resp: 16   Temp: 98.6 °F (37 °C)   SpO2: 94%     Physical Exam  Constitutional:       Appearance: Normal appearance. He is normal weight. HENT:      Head: Normocephalic and atraumatic. Right Ear: External ear normal.      Left Ear: External ear normal.      Nose: Nose normal.      Mouth/Throat:      Pharynx: Oropharynx is clear. Eyes:      Extraocular Movements: Extraocular movements intact. Conjunctiva/sclera: Conjunctivae normal.   Cardiovascular:      Rate and Rhythm: Normal rate and regular rhythm. Pulses: Normal pulses. Heart sounds: Normal heart sounds. Pulmonary:      Effort: Pulmonary effort is normal.      Breath sounds: Normal breath sounds.    Abdominal: General: Abdomen is flat. Bowel sounds are normal.      Palpations: Abdomen is soft. Musculoskeletal:         General: Normal range of motion. Cervical back: Normal range of motion and neck supple. Skin:     General: Skin is warm and dry. Capillary Refill: Capillary refill takes less than 2 seconds. Neurological:      General: No focal deficit present. Mental Status: He is alert. He is disoriented and confused. Comments: Patient answers every question by stating his last name. He will follow simple commands such as taking deep breaths and moving his arms and legs on command. Psychiatric:         Mood and Affect: Mood normal.         Thought Content: Thought content normal.         Cognition and Memory: Cognition is impaired. Judgment: Judgment normal.         Diagnostic Study Results     Labs -     Recent Results (from the past 12 hour(s))   CBC WITH AUTOMATED DIFF    Collection Time: 12/02/22 12:30 PM   Result Value Ref Range    WBC 5.9 4.6 - 13.2 K/uL    RBC 5.14 4.35 - 5.65 M/uL    HGB 15.0 13.0 - 16.0 g/dL    HCT 47.4 36.0 - 48.0 %    MCV 92.2 78.0 - 100.0 FL    MCH 29.2 24.0 - 34.0 PG    MCHC 31.6 31.0 - 37.0 g/dL    RDW 13.2 11.6 - 14.5 %    PLATELET 019 358 - 429 K/uL    MPV 8.5 (L) 9.2 - 11.8 FL    NRBC 0.0 0  WBC    ABSOLUTE NRBC 0.00 0.00 - 0.01 K/uL    NEUTROPHILS 75 (H) 40 - 73 %    LYMPHOCYTES 16 (L) 21 - 52 %    MONOCYTES 8 3 - 10 %    EOSINOPHILS 0 0 - 5 %    BASOPHILS 0 0 - 2 %    IMMATURE GRANULOCYTES 1 (H) 0.0 - 0.5 %    ABS. NEUTROPHILS 4.4 1.8 - 8.0 K/UL    ABS. LYMPHOCYTES 1.0 0.9 - 3.6 K/UL    ABS. MONOCYTES 0.5 0.05 - 1.2 K/UL    ABS. EOSINOPHILS 0.0 0.0 - 0.4 K/UL    ABS. BASOPHILS 0.0 0.0 - 0.1 K/UL    ABS. IMM.  GRANS. 0.0 0.00 - 0.04 K/UL    DF AUTOMATED     METABOLIC PANEL, COMPREHENSIVE    Collection Time: 12/02/22 12:30 PM   Result Value Ref Range    Sodium 141 136 - 145 mmol/L    Potassium 3.3 (L) 3.5 - 5.5 mmol/L    Chloride 100 100 - 111 mmol/L    CO2 34 (H) 21 - 32 mmol/L    Anion gap 7 3.0 - 18 mmol/L    Glucose 120 (H) 74 - 99 mg/dL    BUN 20 (H) 7.0 - 18 MG/DL    Creatinine 1.76 (H) 0.6 - 1.3 MG/DL    BUN/Creatinine ratio 11 (L) 12 - 20      eGFR 43 (L) >60 ml/min/1.73m2    Calcium 9.8 8.5 - 10.1 MG/DL    Bilirubin, total 1.0 0.2 - 1.0 MG/DL    ALT (SGPT) 24 16 - 61 U/L    AST (SGOT) 33 10 - 38 U/L    Alk. phosphatase 107 45 - 117 U/L    Protein, total 9.3 (H) 6.4 - 8.2 g/dL    Albumin 4.4 3.4 - 5.0 g/dL    Globulin 4.9 (H) 2.0 - 4.0 g/dL    A-G Ratio 0.9 0.8 - 1.7         Radiologic Studies -   CT HEAD WO CONT   Final Result   No acute intracranial abnormality. Chronic left parietal infarct. Mild burden of chronic microvascular disease. XR CHEST PORT   Final Result      Mild perihilar hazy streaky infiltrate/edema and borderline prominent cardiac   silhouette, similar to prior. CT Results  (Last 48 hours)                 12/02/22 1322  CT HEAD WO CONT Final result    Impression:  No acute intracranial abnormality. Chronic left parietal infarct. Mild burden of chronic microvascular disease. Narrative:  CT of the head without contrast       HISTORY: Acute confusion. History of traumatic brain injury. Altered mental   status. COMPARISON: CT head 9/21/2022       TECHNIQUE: Axial images of the brain were obtained, without the administration   of intravenous contrast. Coronal and sagittal reconstructions were generated. All CT scans at this facility are performed using dose optimization technique as   appropriate to a performed exam, to include automated exposure control,   adjustment of the MA and/or kV according to patient size (including appropriate   matching for site-specific examinations) or use of  iterative reconstruction   technique. FINDINGS:        Paranasal sinuses and mastoid air cells: Chronic appearing frontal sinus   disease. Mastoid air cells are clear. Calvarium: No acute fracture. Brain: There is generalized prominence of the ventricular system, associated   with proportional widening of the cortical cerebral sulci, compatible with mild   generalized volume loss. Scattered low attenuation involving periventricular and   subcortical white matter, a nonspecific finding which is most commonly   encountered in the setting of chronic microvascular disease. Chronic left   parietal infarct. No acute intracranial hemorrhage. No mass effect or midline   shift. Gray-white matter differentiations are maintained. CXR Results  (Last 48 hours)                 12/02/22 1246  XR CHEST PORT Final result    Impression:      Mild perihilar hazy streaky infiltrate/edema and borderline prominent cardiac   silhouette, similar to prior. Narrative:  EXAMINATION: Chest single view       INDICATION: Confusion       COMPARISON: 9/21/2022       FINDINGS: Single frontal view the chest. Rotation slightly limits evaluation. Upper limits of normal cardiac silhouette size. Perihilar and beyond hazy   streaky density. No confluent consolidation. No definite pneumothorax. No   obvious acute osseous findings. Chronic appearing right fourth rib deformity. Medical Decision Making   I am the first provider for this patient. I reviewed the vital signs, available nursing notes, past medical history, past surgical history, family history and social history. Vital Signs-Reviewed the patient's vital signs. Records Reviewed: Nursing Notes and Old Medical Records     Procedures: None   Procedures    Provider Notes (Medical Decision Making):   70-year-old man presenting with altered mental status of unclear etiology. Will initiate work-up including CT scan, lab work and urinalysis. ED Course as of 12/02/22 1437   Fri Dec 02, 2022   1424 Updated patient and mother on results of CT scan, chest x-ray, lab work.   Patient is acting more appropriately, she states that it is his baseline. It is not clear what caused his acute confusion however it seems to have resolved. Mother is requesting that he be discharged home which seems reasonable at this time. [JR]      ED Course User Index  [JR] Marjorie Blas MD         MED RECONCILIATION:  No current facility-administered medications for this encounter. Current Outpatient Medications   Medication Sig    amLODIPine-benazepril (LOTREL) 10-40 mg per capsule amlodipine 10 mg-benazepril 40 mg capsule    carvedilol (COREG) 25 mg tablet take 1 tablet by mouth twice a day    cloNIDine HCl (CATAPRES) 0.2 mg tablet clonidine HCl 0.2 mg tablet    gabapentin (NEURONTIN) 800 mg tablet gabapentin 800 mg tablet    divalproex ER (DEPAKOTE ER) 500 mg ER tablet Take 1 Tab by mouth daily. lisinopril (PRINIVIL, ZESTRIL) 10 mg tablet Take  by mouth daily. acetaminophen (TYLENOL) 500 mg tablet Take  by mouth every six (6) hours as needed. tamsulosin (FLOMAX) 0.4 mg capsule Take 0.4 mg by mouth daily. dronabinol (MARINOL) 2.5 mg capsule Take 2.5 mg by mouth two (2) times a day. FLUoxetine (PROZAC) 20 mg capsule Take  by mouth daily. Disposition:  Home     DISCHARGE NOTE:   Pt has been reexamined. Patient has no new complaints, changes, or physical findings. Care plan outlined and precautions discussed. Results of workup were reviewed with the patient. All medications were reviewed with the patient. All of pt's questions and concerns were addressed. Patient was instructed and agrees to follow up with PCP as well as to return to the ED upon further deterioration. Patient is ready to go home. Follow-up Information       Follow up With Specialties Details Why Contact Info    Dawn Teran MD Family Medicine In 2 days As needed [address] [facility]  [phone]              Current Discharge Medication List              Diagnosis     Clinical Impression:   1. Acute confusion          \"Please note that this dictation was completed with [email or web address], the computer voice recognition software. Quite often unanticipated grammatical, syntax, homophones, and other interpretive errors are inadvertently transcribed by the computer software. Please disregard these errors. Please excuse any errors that have escaped final proofreading. \"

## 2022-12-02 NOTE — ED NOTES
Received patient via EMS stretcher. Per EMS, patient was brought to firehouse by his mother in her car for AMS. Pt has hx of TBI. EMS unable to describe how his current behavior is altered from his baseline.